# Patient Record
Sex: MALE | Race: WHITE
[De-identification: names, ages, dates, MRNs, and addresses within clinical notes are randomized per-mention and may not be internally consistent; named-entity substitution may affect disease eponyms.]

---

## 2018-03-10 ENCOUNTER — HOSPITAL ENCOUNTER (EMERGENCY)
Dept: HOSPITAL 62 - ER | Age: 38
LOS: 1 days | Discharge: HOME | End: 2018-03-11
Payer: OTHER GOVERNMENT

## 2018-03-10 DIAGNOSIS — R11.10: ICD-10-CM

## 2018-03-10 DIAGNOSIS — F43.10: ICD-10-CM

## 2018-03-10 DIAGNOSIS — F32.9: ICD-10-CM

## 2018-03-10 DIAGNOSIS — T43.592A: ICD-10-CM

## 2018-03-10 DIAGNOSIS — F17.200: ICD-10-CM

## 2018-03-10 DIAGNOSIS — T44.7X2A: Primary | ICD-10-CM

## 2018-03-10 DIAGNOSIS — F10.231: ICD-10-CM

## 2018-03-10 LAB
ADD MANUAL DIFF: NO
ANION GAP SERPL CALC-SCNC: 18 MMOL/L (ref 5–19)
APAP SERPL-MCNC: < 10 UG/ML (ref 10–30)
BARBITURATES UR QL SCN: NEGATIVE
BASOPHILS # BLD AUTO: 0.1 10^3/UL (ref 0–0.2)
BASOPHILS NFR BLD AUTO: 0.9 % (ref 0–2)
BUN SERPL-MCNC: 18 MG/DL (ref 7–20)
CALCIUM: 8.7 MG/DL (ref 8.4–10.2)
CHLORIDE SERPL-SCNC: 96 MMOL/L (ref 98–107)
CO2 SERPL-SCNC: 24 MMOL/L (ref 22–30)
EOSINOPHIL # BLD AUTO: 0.2 10^3/UL (ref 0–0.6)
EOSINOPHIL NFR BLD AUTO: 3.3 % (ref 0–6)
ERYTHROCYTE [DISTWIDTH] IN BLOOD BY AUTOMATED COUNT: 14.5 % (ref 11.5–14)
GLUCOSE SERPL-MCNC: 161 MG/DL (ref 75–110)
HCT VFR BLD CALC: 44.3 % (ref 37.9–51)
HGB BLD-MCNC: 14.8 G/DL (ref 13.5–17)
LYMPHOCYTES # BLD AUTO: 1.6 10^3/UL (ref 0.5–4.7)
LYMPHOCYTES NFR BLD AUTO: 25.8 % (ref 13–45)
MCH RBC QN AUTO: 29.2 PG (ref 27–33.4)
MCHC RBC AUTO-ENTMCNC: 33.5 G/DL (ref 32–36)
MCV RBC AUTO: 87 FL (ref 80–97)
METHADONE UR QL SCN: NEGATIVE
MONOCYTES # BLD AUTO: 0.5 10^3/UL (ref 0.1–1.4)
MONOCYTES NFR BLD AUTO: 7.8 % (ref 3–13)
NEUTROPHILS # BLD AUTO: 3.9 10^3/UL (ref 1.7–8.2)
NEUTS SEG NFR BLD AUTO: 62.2 % (ref 42–78)
PCP UR QL SCN: NEGATIVE
PLATELET # BLD: 320 10^3/UL (ref 150–450)
POTASSIUM SERPL-SCNC: 3.9 MMOL/L (ref 3.6–5)
RBC # BLD AUTO: 5.09 10^6/UL (ref 4.35–5.55)
SALICYLATES SERPL-MCNC: < 1 MG/DL (ref 2–20)
SODIUM SERPL-SCNC: 138.1 MMOL/L (ref 137–145)
TOTAL CELLS COUNTED % (AUTO): 100 %
URINE AMPHETAMINES SCREEN: NEGATIVE
URINE BENZODIAZEPINES SCREEN: NEGATIVE
URINE COCAINE SCREEN: NEGATIVE
URINE MARIJUANA (THC) SCREEN: NEGATIVE
WBC # BLD AUTO: 6.2 10^3/UL (ref 4–10.5)

## 2018-03-10 PROCEDURE — 80048 BASIC METABOLIC PNL TOTAL CA: CPT

## 2018-03-10 PROCEDURE — 99291 CRITICAL CARE FIRST HOUR: CPT

## 2018-03-10 PROCEDURE — 85025 COMPLETE CBC W/AUTO DIFF WBC: CPT

## 2018-03-10 PROCEDURE — 80307 DRUG TEST PRSMV CHEM ANLYZR: CPT

## 2018-03-10 PROCEDURE — 96374 THER/PROPH/DIAG INJ IV PUSH: CPT

## 2018-03-10 PROCEDURE — 93005 ELECTROCARDIOGRAM TRACING: CPT

## 2018-03-10 PROCEDURE — 93010 ELECTROCARDIOGRAM REPORT: CPT

## 2018-03-10 PROCEDURE — 36415 COLL VENOUS BLD VENIPUNCTURE: CPT

## 2018-03-10 PROCEDURE — 96376 TX/PRO/DX INJ SAME DRUG ADON: CPT

## 2018-03-10 NOTE — ER DOCUMENT REPORT
ED General





- General


Stated Complaint: SUICIDAL IDEATION


Time Seen by Provider: 03/10/18 15:34


Notes: 





37-year-old male presents with multidrug overdose.  He took 6 propranolol and 6 

Seroquel this morning at the same time as he stopped drinking after a seven-day 

magdaleno.  History of alcoholism.  Positive suicidal ideation.  He has been 

vomiting is no abdominal pain.  No dizziness or fainting.  Brought in by EMS 

with normal vital signs blood sugar.  Actively suicidal.


TRAVEL OUTSIDE OF THE [place] IN LAST 30 DAYS: No





- Related Data


Allergies/Adverse Reactions: 


 





No Known Allergies Allergy (Verified 03/10/18 17:40)


 











Past Medical History





- Social History


Smoking Status: Current Every Day Smoker


Smoking Education Provided: Yes - The patient ED visit today was directly 

related to their abuse of tobacco. 


Family History: Reviewed & Not Pertinent


Psychiatric Medical History: Reports: Hx Depression





Review of Systems





- Review of Systems


Notes: 





REVIEW OF SYSTEMS





GEN: Denies fever, chills, weight loss


ENT: Denies sore throat, nasal discharge, ear pain


EYES: Denies blurry vision, eye pain, discharge


CV: Denies chest pain, palpitations, edema


RESP: Denies cough, shortness of breath, wheezing


GI: Denies abdominal pain, positive nausea


MSK: Denies joint pain/swelling, edema, 


SKIN: Denies rash, skin lesions


LYMPH: Denies swollen glands/lymph nodes


NEURO: Denies headache, focal weakness or numbness, dizziness


PSYCH: Alcoholism and depression with suicidal ideation








PHYSICAL EXAMINATION





General: Acutely ill, slightly pale


Head: Atraumatic, normocephalic


ENT: Mouth normal, oropharynx moist, no exudates or tonsillar enlargement


Eyes: Conjunctiva normal, pupils equal, lids normal


Neck: No JVD, supple, no guarding


CVS: Normal rate, regular rhythm, no murmurs


Resp: No resp distress, equal and normal breath sounds bilaterally


GI: Nondistended, soft, no tenderness to palpation, no rebound or guarding


Ext: No deformities, no edema, normal range of motion in upper and lower ext


Back: No CVA or midline TTP


Skin: No rash, warm


Lymphatic: No lymphadeopathy noted


Neuro: Awake, alert.  Face symmetric.  GCS 15.  Tremulous, mild with tongue and 

hand fasciculations.


Psychiatric: Suicidal ideation present.





Physical Exam





- Vital signs


Vitals: 


 











Resp BP Pulse Ox


 


 16   100/69   92 


 


 03/10/18 15:48  03/10/18 15:48  03/10/18 15:48














Course





- Re-evaluation


Re-evalutation: 





03/10/18 15:55


Acute ingestion of small doses of propranolol and Seroquel.  Blood pressure is 

soft but not hypotensive, patient is not bradycardic or hypoglycemic so I doubt 

a significant for parental ingestion.  Does not meet criteria for truncal.  In 

terms of Seroquel he looks sluggish but is not comatose.  Will check EKG for 

intervals, check tox labs, monitor closely.  Will place on IVC papers.


03/10/18 16:30


Patient reassessed.  Slightly somnolent after Ativan, but no longer exhibiting 

signs of withdrawal.  Getting IV fluids from EMS.


Labs so far are normal and EKG shows normal intervals.  We will continue to 

observe.  Placed on petition for IVC and constant with the psych team and 

placed a call for psychiatry consult.


03/10/18 18:08


SS.  No signs of withdrawal sleeping calmly.  Vital signs are normal.  Lab 

evaluation is negative.  At this point I am going to medically clear the 

patient for psych consult in the morning.





- Vital Signs


Vital signs: 


 











Temp Pulse Resp BP Pulse Ox


 


 97.8 F      21 H  100/58 L  95 


 


 03/10/18 16:34     03/10/18 18:00  03/10/18 18:00  03/10/18 18:00














- Laboratory


Result Diagrams: 


 03/10/18 15:00





 03/10/18 15:00


Laboratory results interpreted by me: 


 











  03/10/18 03/10/18





  15:00 15:00


 


RDW  14.5 H 


 


Chloride   96 L


 


Glucose   161 H


 


Salicylates   < 1.0 L


 


Acetaminophen   < 10 L














- EKG Interpretation by Me


EKG shows normal: Sinus rhythm


Rate: Normal


Rhythm: NSR - Normal intervals





Critical Care Note





- Critical Care Note


Total time excluding time spent on procedures (mins): 32


Comments: 





The above patient is critically ill.  Not including procedures, but including 

direct re-evaluations, speaking with patient and/or consultants, interpreting 

results, and documenting, I spent the total amount of minute listed listed 

above on critical care time





Discharge





- Discharge


Clinical Impression: 


 Alcohol withdrawal delirium, acute, hyperactive





Intentional propranolol overdose


Qualifiers:


 Encounter type: sequela Qualified Code(s): T44.7X2S - Poisoning by beta-

adrenoreceptor antagonists, intentional self-harm, sequela





Condition: Good


Disposition: PSYCH HOSP/UNIT

## 2018-03-11 VITALS — SYSTOLIC BLOOD PRESSURE: 128 MMHG | DIASTOLIC BLOOD PRESSURE: 74 MMHG

## 2018-03-11 NOTE — ER DOCUMENT REPORT
Doctor's Note


Notes: 





03/11/18 18:13


Rounds earlier today: Chart reviewed and patient interviewed.  Patient took an 

overdose of no significant consequence of about 6 propanolol and 6 Seroquel 

pills.  He says he was thinking about suicide.  Also has been drinking alcohol.

  Labs were all normal.  Vital signs are all normal, except for one heart rate 

of 53.  Patient appears to be medically stable for transfer or discharge.


CLAYTON Jade MD

## 2018-03-11 NOTE — PSYCHOLOGICAL NOTE
Psych Note





- Psych Note


Psych Note: 


Reason for consult: Suicidal ideation


Time of consult 9:15 am Time of Disposition 10:30 am 


Contact Permissions: None 





Patient is a 38-year-old male.  [facility] 

[place] because they have classes and groups of people who experience what 

he experiences in [place].  Patient reports he wants to be around other veterans.  

Patient reports if he is not able to get a bed at the [facility] in [place] that 

he wants his mother to come pick him up so she could drive him there.  Patient 

reports he lives with his parents.  Patient reports that it is around the time 

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  Patient reports that 

he wants other people to talk to about his experience.  Patient reports that he 

was in combat while in [place].  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  

Patient reports he was diagnosed with PTSD.  Patient reports that he took more 

than his prescribed dosage then called the hotline so that he could go to the 

[facility] in [place].  Patient reports he took 6 of his pills.  Patient reports 

that he does not have a plan to kill himself but that he wants to go to an 

inpatient placement.  Patient reports that he wanted clinician to know that if 

she could not get him into [place] that he would drive himself with his 

parents.  Patient reports he does not have a social support system, does not 

attend any groups in [place]. Patient reports that he does not have any 

combat veterans in the area.  She reports he rarely leaves his home and does 

not feel motivated to do anything.  She reports that he sees an outpatient 

therapist [facility] at the [facility] in [place].








Diagnosis: 


311 (F32.9) Unspecified Depressive Disorder, per history


R/O 309.9 (F43.9) Unspecified Trauma and Stressor Related Disorder (patient 

reported PTSD history)








Impression/plan: Patient is psychiatrically cleared for discharge.  

Recommendation for patient to follow-up with outpatient provider at the 

[facility].  Clinician observed patient presented to the ED with 

intention of getting transferred to [facility].  Consulted with Dr. Amador regarding the management and care of patient.

## 2018-07-07 ENCOUNTER — HOSPITAL ENCOUNTER (EMERGENCY)
Dept: HOSPITAL 62 - ER | Age: 38
LOS: 3 days | Discharge: HOME | End: 2018-07-10
Payer: OTHER GOVERNMENT

## 2018-07-07 DIAGNOSIS — F10.920: Primary | ICD-10-CM

## 2018-07-07 DIAGNOSIS — F43.10: ICD-10-CM

## 2018-07-07 DIAGNOSIS — R45.851: ICD-10-CM

## 2018-07-07 LAB
ADD MANUAL DIFF: NO
ALBUMIN SERPL-MCNC: 4.7 G/DL (ref 3.5–5)
ALP SERPL-CCNC: 81 U/L (ref 38–126)
ALT SERPL-CCNC: 73 U/L (ref 21–72)
ANION GAP SERPL CALC-SCNC: 18 MMOL/L (ref 5–19)
APAP SERPL-MCNC: < 10 UG/ML (ref 10–30)
APPEARANCE UR: (no result)
APTT PPP: YELLOW S
AST SERPL-CCNC: 73 U/L (ref 17–59)
BARBITURATES UR QL SCN: NEGATIVE
BASOPHILS # BLD AUTO: 0.1 10^3/UL (ref 0–0.2)
BASOPHILS NFR BLD AUTO: 2.2 % (ref 0–2)
BILIRUB DIRECT SERPL-MCNC: 0.3 MG/DL (ref 0–0.4)
BILIRUB SERPL-MCNC: 0.3 MG/DL (ref 0.2–1.3)
BILIRUB UR QL STRIP: NEGATIVE
BUN SERPL-MCNC: 10 MG/DL (ref 7–20)
CALCIUM: 8.8 MG/DL (ref 8.4–10.2)
CHLORIDE SERPL-SCNC: 103 MMOL/L (ref 98–107)
CO2 SERPL-SCNC: 28 MMOL/L (ref 22–30)
EOSINOPHIL # BLD AUTO: 0.3 10^3/UL (ref 0–0.6)
EOSINOPHIL NFR BLD AUTO: 5.7 % (ref 0–6)
ERYTHROCYTE [DISTWIDTH] IN BLOOD BY AUTOMATED COUNT: 14.8 % (ref 11.5–14)
ETHANOL SERPL-MCNC: 443 MG/DL
GLUCOSE SERPL-MCNC: 113 MG/DL (ref 75–110)
GLUCOSE UR STRIP-MCNC: NEGATIVE MG/DL
HCT VFR BLD CALC: 44.5 % (ref 37.9–51)
HGB BLD-MCNC: 15.2 G/DL (ref 13.5–17)
KETONES UR STRIP-MCNC: NEGATIVE MG/DL
LYMPHOCYTES # BLD AUTO: 1.8 10^3/UL (ref 0.5–4.7)
LYMPHOCYTES NFR BLD AUTO: 36.8 % (ref 13–45)
MCH RBC QN AUTO: 31.2 PG (ref 27–33.4)
MCHC RBC AUTO-ENTMCNC: 34.2 G/DL (ref 32–36)
MCV RBC AUTO: 91 FL (ref 80–97)
METHADONE UR QL SCN: NEGATIVE
MONOCYTES # BLD AUTO: 0.4 10^3/UL (ref 0.1–1.4)
MONOCYTES NFR BLD AUTO: 9.1 % (ref 3–13)
NEUTROPHILS # BLD AUTO: 2.2 10^3/UL (ref 1.7–8.2)
NEUTS SEG NFR BLD AUTO: 46.2 % (ref 42–78)
NITRITE UR QL STRIP: NEGATIVE
PCP UR QL SCN: NEGATIVE
PH UR STRIP: 8 [PH] (ref 5–9)
PLATELET # BLD: 526 10^3/UL (ref 150–450)
POTASSIUM SERPL-SCNC: 4.3 MMOL/L (ref 3.6–5)
PROT SERPL-MCNC: 7.8 G/DL (ref 6.3–8.2)
PROT UR STRIP-MCNC: 30 MG/DL
RBC # BLD AUTO: 4.89 10^6/UL (ref 4.35–5.55)
SALICYLATES SERPL-MCNC: < 1 MG/DL (ref 2–20)
SODIUM SERPL-SCNC: 148.8 MMOL/L (ref 137–145)
SP GR UR STRIP: 1.01
TOTAL CELLS COUNTED % (AUTO): 100 %
URINE AMPHETAMINES SCREEN: NEGATIVE
URINE BENZODIAZEPINES SCREEN: NEGATIVE
URINE COCAINE SCREEN: NEGATIVE
URINE MARIJUANA (THC) SCREEN: NEGATIVE
UROBILINOGEN UR-MCNC: NEGATIVE MG/DL (ref ?–2)
WBC # BLD AUTO: 4.8 10^3/UL (ref 4–10.5)

## 2018-07-07 PROCEDURE — 80053 COMPREHEN METABOLIC PANEL: CPT

## 2018-07-07 PROCEDURE — 96360 HYDRATION IV INFUSION INIT: CPT

## 2018-07-07 PROCEDURE — 36415 COLL VENOUS BLD VENIPUNCTURE: CPT

## 2018-07-07 PROCEDURE — 85025 COMPLETE CBC W/AUTO DIFF WBC: CPT

## 2018-07-07 PROCEDURE — 80307 DRUG TEST PRSMV CHEM ANLYZR: CPT

## 2018-07-07 PROCEDURE — 93010 ELECTROCARDIOGRAM REPORT: CPT

## 2018-07-07 PROCEDURE — 96372 THER/PROPH/DIAG INJ SC/IM: CPT

## 2018-07-07 PROCEDURE — 99285 EMERGENCY DEPT VISIT HI MDM: CPT

## 2018-07-07 PROCEDURE — 93005 ELECTROCARDIOGRAM TRACING: CPT

## 2018-07-07 PROCEDURE — 81001 URINALYSIS AUTO W/SCOPE: CPT

## 2018-07-07 PROCEDURE — S0119 ONDANSETRON 4 MG: HCPCS

## 2018-07-07 NOTE — ER DOCUMENT REPORT
ED Medical Screen (RME)





- General


Chief Complaint: Suicidal Ideation


Stated Complaint: PYSCH EVAULATION


Time Seen by Provider: 07/07/18 10:11


Notes: 


The patient is a 38-year-old male, past medical history PTSD, chronic alcoholic

, presents with increasing drinking and combativeness.  He last drank this 

morning and thinks he might be withdrawing. Not taking any of [facility] med[facility] 

from the [facility].





PE: Mildly agitated. Tachycardia. Appears intoxicated.





I have greeted and performed a rapid initial assessment of this patient.  A 

comprehensive ED assessment and evaluation of the patient, analysis of test 

results and completion of the medical decision making process will be conducted 

by additional ED providers.


TRAVEL OUTSIDE OF THE [place] IN LAST 30 DAYS: No





- Related Data


Allergies/Adverse Reactions: 


 





No Known Allergies Allergy (Verified 07/07/18 09:54)


 











Past Medical History





- Social History


Family history: Thyroid Disfunction


Renal/ Medical History: Denies: Hx Peritoneal Dialysis


Psychiatric Medical History: Reports: Hx Depression





Physical Exam





- Vital signs


Vitals: 





 











Temp Pulse Resp BP


 


 98.0 F   128 H  22 H  124/86 H


 


 07/07/18 09:59  07/07/18 09:59  07/07/18 09:59  07/07/18 09:59














Course





- Vital Signs


Vital signs: 





 











Temp Pulse Resp BP Pulse Ox


 


 98.0 F   128 H  22 H  124/86 H   


 


 07/07/18 09:59  07/07/18 09:59  07/07/18 09:59  07/07/18 09:59

## 2018-07-07 NOTE — ER DOCUMENT REPORT
Addendum entered and electronically signed by CAITLIN PATEL LCSWA  07/10/18 11:

58: 








Discharge





- Discharge


Clinical Impression: 


 Alcohol abuse, Post traumatic stress disorder (PTSD), Suicidal ideation





Alcohol intoxication


Qualifiers:


 Complication of substance-induced condition: uncomplicated Qualified Code(s): 

F10.920 - Alcohol use, unspecified with intoxication, uncomplicated





Condition: Stable


Disposition: HOME, SELF-CARE


Additional Instructions: 


ACUTE ALCOHOL INTOXICATION and ALCOHOL ABUSE:





     Your evaluation revealed very high levels of alcohol.  You can die from 

drinking a large amount of alcohol rapidly!  Further, there's the risk of falls

, traffic accidents, and fights.  A high portion (about 50 percent) of the 

serious injuries seen in hospital emergency rooms are caused by alcohol.


     Alcohol overdosage is usually due to an underlying emotional or 

psychiatric problem.  You may benefit from counselling.


     If "binge" drinking is an ongoing problem for you, or if you drink ANY 

AMOUNT of alcohol EVERY day, you most likely have a tendency to alcoholism.  

You should avoid alcohol totally.  We can refer you for treatment.  Persons 

with alcohol problems are often also prone to other addictions -- you should 

discuss any use of medications or drugs with the doctor.


     You should be watched at home for the next several hours by someone who 

has not been drinking. Get extra fluids for the next 24 hours.  Call the doctor 

if there is repeated vomiting, increasing headache, decreasing level of 

alertness, or any other worsening.








CHRONIC ALCOHOLISM and ALCOHOL ABUSE:





     Your evaluation reveals evidence of chronic alcoholism, an addiction to 

alcohol.  The tendency to alcoholism may be inherited.


     Chronic use of alcohol weakens muscles, causes fatty deposits in the liver

, damages the stomach, makes you more prone to infections, and can cause birth 

defects in unborn children.  In the long run, brain atrophy and cirrhosis of 

the liver result.  You are also at greater risk for certain types of cancer, 

such as cancer of the mouth, throat, stomach, and liver.


     Counselling services are available to help you.  In-hospital treatment 

programs often help.  Support groups such as Alcoholics Anonymous can be very 

useful in beating this addiction.  Your physician can make a referral for you.


     As alcoholics often are prone to other addictions, you should discuss your 

use of any other medications with the doctor.








ALCOHOL WITHDRAWAL:





     Your symptoms are caused by alcohol withdrawal. After a period of frequent 

drinking, the brain and body are changed by the alcohol. When you quit or 

reduce your drinking, the nervous system becomes unstable. Withdrawal symptoms 

can start a few hours after your last drink, but sometimes don't begin until a 

couple of days later. Symptoms can include shakiness, sweating, insomnia, nausea

, vomiting, fearfulness, hallucinations, and seizures.


     In addition to the acute effects of alcohol withdrawal, we often have to 

deal with the medical effects of alcoholism. These problems often include 

dehydration, stomach irritation, intestinal bleeding, low blood sugar, liver 

disease, and pancreas inflammation.


     Treatment for alcohol withdrawal includes mild sedatives, vitamins, and 

fluids. You need to be with someone who can help if symptoms become severe. 

Many patients can withdraw at home. Admission to the hospital or a detox 

facility may be necessary if withdrawal symptoms are severe and uncontrollable.


     Abstaining from alcohol is the only effective long-term treatment. If you 

start drinking again, you will not be able to control yourself after the first 

drink. Treatment programs are available. In addition, many alcoholics benefit 

from Alcoholics Anonymous or other support groups available through your 

counselor or [employer] . AL-FADUMO and JASPAL-TENA are support groups for 

friends and family members of an alcoholic.


     Go to the emergency room if you develop persistent vomiting, severe 

abdominal pain, fever, shortness of breath, hallucinations, uncontrollable 

tremors, or seizures.





Post-Traumatic Stress Disorder





     You seem to have post-traumatic stress disorder (PTSD). PTSD can cause 

chronic anxiety, sleeping problems, social withdrawal, and drug abuse. It can 

occur following a traumatic personal experience such as an accident, rape, 

assault, or death of a loved one, or after experiencing a war or natural 

disaster.  


     Symptoms may be delayed for days or even years. Emotional numbing, the 

inability to express grief, is usually the earliest sign. There may be apathy 

or agitation, aggression, and inability to perform ordinary tasks. Often there 

are frightening nightmares and sudden, intruding memories of the trauma. Panic 

attacks and feelings of guilt are common. Alcohol and drug use make post-

traumatic stress symptoms worse.


     Medication may be temporarily necessary to combat anxiety, panic attacks, 

and depression. Medicine should not be considered a "cure." You must deal with 

the trauma and prepare to go on. Group therapy is often helpful. This helps you 

"talk through" the problem with others who share your symptoms. We can provide 

you with an appropriate referral.





[facility] and 

[facility] treatment on 7/13/2018 at 11 AM.  You have also been provided 

additional resources which include residential treatment programs for alcohol 

abuse.  AT ANY TIME, IF YOUR SYMPTOMS CHANGE SIGNIFICANTLY OR WORSEN OR YOU 

DEVELOP NEW SYMPTOMS, RETURN TO THE EMERGENCY DEPARTMENT IMMEDIATELY FOR RE-

EVALUATION.














Referrals: 


[facility] [Provider Group] - 07/13/18 11:00 am (You also have 

an appointment for medication mangement tomorrow 7/11/2018 at 10:30)





Original Note:








ED Psych Disorder / Suicide





- General


Mode of Arrival: Ambulatory


Information source: Patient, Relative - FATHER


TRAVEL OUTSIDE OF THE [place] IN LAST 30 DAYS: No





- HPI


Patient complains to provider of: Agitated, Suicidal ideation


Onset: Yesterday


Onset was: Cannot confirm


Quality of pain: No pain


Severity: Moderate


Suicide Risk Factors: Male, Other mental health dx.


Situational problems related to: Other - PTSD


Normal mood: No


Associated symptoms: Agitated, Depressed, Irritable, Tearful, Unable to sleep


Similar symptoms previously: No


Recently seen / treated by doctor: No





<CRISPIN GUILLEN - Last Filed: 07/07/18 19:40>





<CAITLIN PATEL - Last Filed: 07/10/18 11:57>





<ESTER SHIPLEY - Last Filed: 07/10/18 16:22>





- General


Chief Complaint: Suicidal Ideation


Stated Complaint: PYSCH EVAULATION


Time Seen by Provider: 07/07/18 10:11





- HPI


Notes: 





This patient apparently has a long-standing problem with PTSD and alcoholism.  

[facility]  Parent says patient ran out of all of his 

medicines about a month ago and did okay for a while but now is back to his 

depressed, alcoholic, suicidal baseline. (CRISPIN GUILLEN)





- Related Data


Allergies/Adverse Reactions: 


 





No Known Allergies Allergy (Verified 07/07/18 09:54)


 











Past Medical History





- General


Information source: Patient, Relative





- Social History


Smoking Status: Unknown if Ever Smoked


Cigarette use (# per day): No


Chew tobacco use (# tins/day): No


Frequency of alcohol use: Heavy


Drug Abuse: None


Family History: Reviewed & Not Pertinent


Patient has suicidal ideation: No


Patient has homicidal ideation: No





- Past Medical History


Cardiac Medical History: Reports: None


Pulmonary Medical History: Reports: None


EENT Medical History: Reports: None


Neurological Medical History: Reports: None


Endocrine Medical History: Reports: None


Renal/ Medical History: Reports: None.  Denies: Hx Peritoneal Dialysis


Malignancy Medical History: Reports None


GI Medical History: Reports: None


Musculoskeltal Medical History: Reports None


Psychiatric Medical History: Reports: Hx Depression, Hx Post Traumatic Stress 

Disorder, Other - ALCOHOLISM





<CRISPIN GUILLEN - Last Filed: 07/07/18 19:40>





Review of Systems





- Review of Systems


Constitutional: No symptoms reported


EENT: No symptoms reported


Cardiovascular: No symptoms reported


Respiratory: No symptoms reported


Gastrointestinal: Nausea


Genitourinary: No symptoms reported


Musculoskeletal: No symptoms reported


Skin: No symptoms reported


Neurological/Psychological: See HPI





<CRISPIN GUILLEN - Last Filed: 07/07/18 19:40>





Physical Exam





- Vital signs


Interpretation: Tachycardic, Tachypneic.  No: Hypertensive





- General


General appearance: Anxious


In distress: None





- HEENT


Head: Normocephalic


Eyes: Normal


Conjunctiva: Normal


Ears: Normal


Nasal: Normal


Mouth/Lips: Normal


Mucous membranes: Dry





- Respiratory


Respiratory status: No respiratory distress


Breath sounds: Normal





- Cardiovascular


Rhythm: Regular


Heart sounds: Normal auscultation


Murmur: No





- Abdominal


Inspection: Normal


Distension: No distension


Bowel sounds: Hypoactive





- Extremities


General upper extremity: Normal inspection


General lower extremity: Normal inspection





- Neurological


Neuro grossly intact: Yes


Cognition: Normal


Orientation: AAOx4





- Psychological


Associated symptoms: Agitated, Depressed, Tearful, Other - ADMITS TO S.I.





- Skin


Skin Temperature: Warm


Skin Moisture: Dry


Skin Color: Normal


Skin Turgor: Elastic





<CRISPIN GUILLEN - Last Filed: 07/07/18 19:40>





<CAITLIN PATEL - Last Filed: 07/10/18 11:57>





<ESTER SHIPLEY - Last Filed: 07/10/18 16:22>





- Vital signs


Vitals: 





 











Temp Pulse Resp BP


 


 98.0 F   128 H  22 H  124/86 H


 


 07/07/18 09:59  07/07/18 09:59  07/07/18 09:59  07/07/18 09:59














- HEENT


Notes: 





STRONG ETHANOL ODOR TO BREATH (CRISPIN GUILLEN)





Course





- Laboratory


Result Diagrams: 


 07/07/18 10:25





 07/07/18 10:25





- EKG Interpretation by Me


EKG shows normal: Sinus rhythm, Axis, Intervals, QRS Complexes.  abnormal: ST-T 

Waves - BORDERLINE T ABNLS, NS


Rate: Tachycardia





<WILMERCRISPIN - Last Filed: 07/07/18 19:40>





- Laboratory


Result Diagrams: 


 07/07/18 10:25





 07/07/18 10:25





<CAITLIN PATEL - Last Filed: 07/10/18 11:57>





- Laboratory


Result Diagrams: 


 07/07/18 10:25





 07/07/18 10:25





<ESTER SHIPLEY - Last Filed: 07/10/18 16:22>





- Vital Signs


Vital signs: 





 











Temp Pulse Resp BP Pulse Ox


 


 97.8 F   73   18   117/86 H  99 


 


 07/09/18 22:00  07/09/18 22:00  07/09/18 22:00  07/09/18 22:00  07/09/18 22:00














- Laboratory


Laboratory results interpreted by me: 





 











  07/07/18 07/07/18 07/07/18





  10:25 10:25 11:45


 


RDW  14.8 H  


 


Plt Count  526 H  


 


Basophils %  2.2 H  


 


Sodium   148.8 H 


 


Glucose   113 H 


 


AST   73 H 


 


ALT   73 H 


 


Urine Protein    30 H


 


Salicylates   < 1.0 L 


 


Acetaminophen   < 10 L 


 


Serum Alcohol   443 H* 














Discharge





<TOMAAKUACLARISACRISPIN - Last Filed: 07/07/18 19:40>





<CAITLIN PATEL - Last Filed: 07/10/18 11:57>





<ESTER SHIPLEY - Last Filed: 07/10/18 16:22>





- Discharge


Clinical Impression: 


 Alcohol abuse, Post traumatic stress disorder (PTSD), Suicidal ideation





Alcohol intoxication


Qualifiers:


 Complication of substance-induced condition: uncomplicated Qualified Code(s): 

F10.920 - Alcohol use, unspecified with intoxication, uncomplicated





Condition: Stable


Disposition: HOME, SELF-CARE


Additional Instructions: 


ACUTE ALCOHOL INTOXICATION and ALCOHOL ABUSE:





     Your evaluation revealed very high levels of alcohol.  You can die from 

drinking a large amount of alcohol rapidly!  Further, there's the risk of falls

, traffic accidents, and fights.  A high portion (about 50 percent) of the 

serious injuries seen in hospital emergency rooms are caused by alcohol.


     Alcohol overdosage is usually due to an underlying emotional or 

psychiatric problem.  You may benefit from counselling.


     If "binge" drinking is an ongoing problem for you, or if you drink ANY 

AMOUNT of alcohol EVERY day, you most likely have a tendency to alcoholism.  

You should avoid alcohol totally.  We can refer you for treatment.  Persons 

with alcohol problems are often also prone to other addictions -- you should 

discuss any use of medications or drugs with the doctor.


     You should be watched at home for the next several hours by someone who 

has not been drinking. Get extra fluids for the next 24 hours.  Call the doctor 

if there is repeated vomiting, increasing headache, decreasing level of 

alertness, or any other worsening.








CHRONIC ALCOHOLISM and ALCOHOL ABUSE:





     Your evaluation reveals evidence of chronic alcoholism, an addiction to 

alcohol.  The tendency to alcoholism may be inherited.


     Chronic use of alcohol weakens muscles, causes fatty deposits in the liver

, damages the stomach, makes you more prone to infections, and can cause birth 

defects in unborn children.  In the long run, brain atrophy and cirrhosis of 

the liver result.  You are also at greater risk for certain types of cancer, 

such as cancer of the mouth, throat, stomach, and liver.


     Counselling services are available to help you.  In-hospital treatment 

programs often help.  Support groups such as Alcoholics Anonymous can be very 

useful in beating this addiction.  Your physician can make a referral for you.


     As alcoholics often are prone to other addictions, you should discuss your 

use of any other medications with the doctor.








ALCOHOL WITHDRAWAL:





     Your symptoms are caused by alcohol withdrawal. After a period of frequent 

drinking, the brain and body are changed by the alcohol. When you quit or 

reduce your drinking, the nervous system becomes unstable. Withdrawal symptoms 

can start a few hours after your last drink, but sometimes don't begin until a 

couple of days later. Symptoms can include shakiness, sweating, insomnia, nausea

, vomiting, fearfulness, hallucinations, and seizures.


     In addition to the acute effects of alcohol withdrawal, we often have to 

deal with the medical effects of alcoholism. These problems often include 

dehydration, stomach irritation, intestinal bleeding, low blood sugar, liver 

disease, and pancreas inflammation.


     Treatment for alcohol withdrawal includes mild sedatives, vitamins, and 

fluids. You need to be with someone who can help if symptoms become severe. 

Many patients can withdraw at home. Admission to the hospital or a detox 

facility may be necessary if withdrawal symptoms are severe and uncontrollable.


     Abstaining from alcohol is the only effective long-term treatment. If you 

start drinking again, you will not be able to control yourself after the first 

drink. Treatment programs are available. In addition, many alcoholics benefit 

from Alcoholics Anonymous or other support groups available through your 

counselor or [employer] . AL-ANOCHARLES and JASPAL-TEEN are support groups for 

friends and family members of an alcoholic.


     Go to the emergency room if you develop persistent vomiting, severe 

abdominal pain, fever, shortness of breath, hallucinations, uncontrollable 

tremors, or seizures.





Post-Traumatic Stress Disorder





     You seem to have post-traumatic stress disorder (PTSD). PTSD can cause 

chronic anxiety, sleeping problems, social withdrawal, and drug abuse. It can 

occur following a traumatic personal experience such as an accident, rape, 

assault, or death of a loved one, or after experiencing a war or natural 

disaster.  


     Symptoms may be delayed for days or even years. Emotional numbing, the 

inability to express grief, is usually the earliest sign. There may be apathy 

or agitation, aggression, and inability to perform ordinary tasks. Often there 

are frightening nightmares and sudden, intruding memories of the trauma. Panic 

attacks and feelings of guilt are common. Alcohol and drug use make post-

traumatic stress symptoms worse.


     Medication may be temporarily necessary to combat anxiety, panic attacks, 

and depression. Medicine should not be considered a "cure." You must deal with 

the trauma and prepare to go on. Group therapy is often helpful. This helps you 

"talk through" the problem with others who share your symptoms. We can provide 

you with an appropriate referral.





[facility] treatment on 7/13/2018 at 11 AM.  You have also been provided 

additional resources which include residential treatment programs for alcohol 

abuse.  AT ANY TIME, IF YOUR SYMPTOMS CHANGE SIGNIFICANTLY OR WORSEN OR YOU 

DEVELOP NEW SYMPTOMS, RETURN TO THE EMERGENCY DEPARTMENT IMMEDIATELY FOR RE-

EVALUATION.














Prescriptions: 


Olanzapine [Zyprexa 5 mg Tablet] 5 mg PO QHS #14 tablet


Buspirone HCl [Buspar 5 mg Tablet] 1 tab PO BID #30 tab


Referrals: 


[facility] [Provider Group] - 07/13/18 11:00 am (You also have 

an appointment for medication mangement tomorrow 7/11/2018 at 10:30)

## 2018-07-07 NOTE — EKG REPORT
SEVERITY:- BORDERLINE ECG -

SINUS TACHYCARDIA

BORDERLINE T WAVE ABNORMALITIES

:

Confirmed by: Amilcar Garibay MD 07-Jul-2018 10:36:18

## 2018-07-07 NOTE — PSYCHOLOGICAL NOTE
Psych Note





- Psych Note


Psych Note: 





The patient is a 38-year-old male, past medical history PTSD, chronic alcoholic

, presents with increasing drinking and combativeness.  He last drank this 

morning and thinks he might be withdrawing. [facility].





Patient is currently heavily intoxicated. Patient disclosed that he has been 

binge drinking for the last 2 weeks.  Patient is withdrawn and unwilling to 

engage with clinician.  Patient refuses to make eye contact and asks for 

privacy. When it was explained the patient is on IVC he started to cry but 

still refused to look at clinician. 





no medication recommendations at this time








291.9 (F10.99) Unspecified Alcohol Related Disorder, per history


311 (F32.9) Unspecified Depressive Disorder, per history


309.9 (F43.9) Unspecified Trauma and Stressor Related Disorder





Impression/Plan: Recommendation to IVC.  Patient is currently intoxicated and 

has impaired insight, judgment and impulse control. Patient is withdrawn and 

unwilling to engage with clinician.  When it was explained the patient is on 

IVC he started to cry but still refused to look at clinician. Consulted with 

Dr. Amador regarding the management and care of patient. ED Doctor in 

agreement with recommendations.

## 2018-07-08 NOTE — ER DOCUMENT REPORT
Doctor's Note


Notes: 





07/08/18 09:36


I saw and evaluated the patient. Vitals stable. Patient has no complaints at 

this time. Patient complained of tremors early this AM. Dr. Chairez saw the 

patient. Ativan provided although he did not believe the patient was in true 

DTs. Patient denies daily ETOH consumption.  Patient denies suicidal or 

homicidal ideations. No delusions or hallucinations. I will discuss plan of 

care with behavioral health.

## 2018-07-09 NOTE — PSYCHOLOGICAL NOTE
Psych Note





- Psych Note


Psych Note: 


Reason for Consult: suicidal ideation and substance abuse





The patient is a 38-year-old male, past medical history PTSD, chronic alcoholic

, presents with increasing drinking and combativeness.  He last drank this 

morning and thinks he might be withdrawing. Not [facility].





Clinician conducted checking with patient


Patient discloses that he is concerned about taking up in bed here in [facility] ED.  

He continues to state that he is found he has had more success going straight 

to [place] [facility]  He disclosed that in 2017 

he had a better year because he was in treatment still, he confirms that he is 

not completely medication compliant currently.  Patient confirms that he 

probably made suicidal comments while under the influence but denies having a 

plan.





Clinician attempted to contact patient's mother; voicemail not set up.





Medication recommendations per [facility]'s contracted psychiatrist Dr. Ania MEAD 

are as follows


1.  Zyprexa 5 mg nightly


2.  BuSpar 5 mg twice daily





Diagnosis


291.9 (F10.99) Unspecified Alcohol Related Disorder, per history


311 (F32.9) Unspecified Depressive Disorder, per history


309.9 (F43.9) Unspecified Trauma and Stressor Related Disorder





Impression/Plan: Recommendation to continue under IVC.  Patient is in need of 

significant treatment for both his mental health and substance abuse.  Patient 

has a history of binge drinking and suicidal attempts.  Patient is recommended 

[facility]  Consulted with Dr. Amador regarding 

the management and care of patient. ED Doctor in agreement with 

recommendations.

## 2018-07-09 NOTE — ER DOCUMENT REPORT
Doctor's Note


Notes: 


07/09/18 09:09 I have evaluated this patient this am and has no c/o at this 

time. Feels all of their needs are being met and physical exam is normal.  

Awaiting dispositon per mental health.





07/09/18 15:27 Mental Health is recommending Zyprexa 5mg qHS and Buspar 5 mg 

bid.

## 2018-07-10 VITALS — SYSTOLIC BLOOD PRESSURE: 115 MMHG | DIASTOLIC BLOOD PRESSURE: 80 MMHG

## 2018-07-10 NOTE — ER DOCUMENT REPORT
Doctor's Note


Notes: 





07/10/18 09:41


Pt is aware that he is not being [facility] but is to be discharged 

home to his parents. He denies any alcohol withdrawals. Pt communicates well 

and is cooperative although he appears to be irritated that he is here.








PHYSICAL EXAM:





GENERAL: Alert. No acute distress.





HEAD: Normocephalic, atraumatic.





EYES: Pupils equal, round, and reactive to light. Extraocular movements intact.





ENT: Oral mucosa moist, tongue midline. 





NECK: Full range of motion. Supple. Trachea midline.





LUNGS: Clear to auscultation bilaterally, no wheezes, rales, or rhonchi. No 

respiratory distress.





HEART: Regular rate and rhythm. No murmurs, gallops, or rubs.





ABDOMEN: Soft, non-tender. Non-distended. Bowel sounds present in all 4 

quadrants. No guarding, rebound, or rigidity.





EXTREMITIES: Moves all 4 extremities spontaneously. 





NEUROLOGICAL: Alert and oriented x3. Normal speech.





PSYCH: Communicates well. Appears irritated that he is here, however 

cooperative.





SKIN: Warm, dry.


 (BHUPENDRA CANNON)








07/10/18 20:00


Patient is stable, father came to pick him up around 5:00 in the evening, 

discharged to home with his parents.  Prescriptions written for 2 weeks of 

medications that we started here. (ESTER SHIPLEY)





Scribe Documentation





- Scribe


Written by Scribe:: Bhupendra Cannon, Gar[place] 7/10/18 1142


acting as scribe for :: Shamir

## 2018-07-10 NOTE — PSYCHOLOGICAL NOTE
Psych Note





- Psych Note


Psych Note: 





Reason for Consult: suicidal ideation and substance abuse





The patient is a 38-year-old male, past medical history PTSD, chronic alcoholic

, presents with increasing drinking and combativeness.  He last drank this 

morning and thinks he might be withdrawing. Not taking [facility] 

[facility].





Clinician conducted checking with patient


Patient disclosed that he is feeling "all right" and denies suicidal and 

homicidal ideation.  Patient asked with the current plan is for today.  He 

reports that his mother works at [employer] if her cell phone is 

not working.  He confirms he has his father's phone number and his cell phone 

and agrees to allow clinician to make contact with him.





Clinician attempted to contact patient's mother again today; voicemail not set 

up.





Clinician spoke with patient's father.  He disclosed the patient's PTSD and 

alcoholism has created the patient needing services like "revolving door...He 

[facility] many [facility] and as soon as the probe to deep he leaves...he 

has many medications then stop taking them...then starts self medicating 

because he is an alcoholic." He continued to report the patient "This last week 

he was hibernating in his room and was continually drunk...I have had to 

disable his car because he loves to drink drunk." 





[facility].  Patient's 

appointment is July 24, 2018 at 2 PM.





Medication recommendations per [facility]'s contracted psychiatrist Dr. Ania MEAD 

are as follows


1.  Zyprexa 5 mg nightly


2.  BuSpar 5 mg twice daily





Diagnosis


291.9 (F10.99) Unspecified Alcohol Related Disorder, per history


311 (F32.9) Unspecified Depressive Disorder, per history


309.9 (F43.9) Unspecified Trauma and Stressor Related Disorder





Impression/Plan: Patient is recommended for rescind of IVC and is considered 

cleared from acute psychiatric services.  Patient is no longer under the 

influence and denies suicidal and homicidal ideation.  Patient discloses 

difficulty in maintaining sobriety and has had a very difficult time with his 

treatment of PTSD.  [facility].  His next appointment is July 24, 2018 at 2 PM. Patient is recommended 

for intensive [facility] the [facility]. Additional information has been 

provided to the patient in regards to long-term residential substance abuse 

treatment.  Consulted with Dr. Amador regarding the management and care of 

patient. ED Doctor in agreement with recommendations.

## 2019-04-19 ENCOUNTER — HOSPITAL ENCOUNTER (EMERGENCY)
Dept: HOSPITAL 62 - ER | Age: 39
Discharge: HOME | End: 2019-04-19
Payer: OTHER GOVERNMENT

## 2019-04-19 VITALS — SYSTOLIC BLOOD PRESSURE: 113 MMHG | DIASTOLIC BLOOD PRESSURE: 81 MMHG

## 2019-04-19 DIAGNOSIS — Z79.899: ICD-10-CM

## 2019-04-19 DIAGNOSIS — F32.9: ICD-10-CM

## 2019-04-19 DIAGNOSIS — F10.10: Primary | ICD-10-CM

## 2019-04-19 DIAGNOSIS — F41.9: ICD-10-CM

## 2019-04-19 DIAGNOSIS — R00.0: ICD-10-CM

## 2019-04-19 DIAGNOSIS — R11.10: ICD-10-CM

## 2019-04-19 DIAGNOSIS — F43.10: ICD-10-CM

## 2019-04-19 LAB
ADD MANUAL DIFF: NO
ALBUMIN SERPL-MCNC: 5.1 G/DL (ref 3.5–5)
ALP SERPL-CCNC: 115 U/L (ref 38–126)
ALT SERPL-CCNC: 31 U/L (ref 21–72)
ANION GAP SERPL CALC-SCNC: 20 MMOL/L (ref 5–19)
APAP SERPL-MCNC: < 10 UG/ML (ref 10–30)
APPEARANCE UR: (no result)
APTT PPP: YELLOW S
AST SERPL-CCNC: 62 U/L (ref 17–59)
BARBITURATES UR QL SCN: NEGATIVE
BASOPHILS # BLD AUTO: 0.1 10^3/UL (ref 0–0.2)
BASOPHILS NFR BLD AUTO: 0.7 % (ref 0–2)
BILIRUB DIRECT SERPL-MCNC: 0.3 MG/DL (ref 0–0.4)
BILIRUB SERPL-MCNC: 0.7 MG/DL (ref 0.2–1.3)
BILIRUB UR QL STRIP: NEGATIVE
BUN SERPL-MCNC: 26 MG/DL (ref 7–20)
CALCIUM: 9.5 MG/DL (ref 8.4–10.2)
CHLORIDE SERPL-SCNC: 100 MMOL/L (ref 98–107)
CO2 SERPL-SCNC: 23 MMOL/L (ref 22–30)
EOSINOPHIL # BLD AUTO: 0 10^3/UL (ref 0–0.6)
EOSINOPHIL NFR BLD AUTO: 0.5 % (ref 0–6)
ERYTHROCYTE [DISTWIDTH] IN BLOOD BY AUTOMATED COUNT: 14.1 % (ref 11.5–14)
ETHANOL SERPL-MCNC: 267 MG/DL
GLUCOSE SERPL-MCNC: 146 MG/DL (ref 75–110)
GLUCOSE UR STRIP-MCNC: NEGATIVE MG/DL
HCT VFR BLD CALC: 49.3 % (ref 37.9–51)
HGB BLD-MCNC: 16.9 G/DL (ref 13.5–17)
KETONES UR STRIP-MCNC: 20 MG/DL
LYMPHOCYTES # BLD AUTO: 2.1 10^3/UL (ref 0.5–4.7)
LYMPHOCYTES NFR BLD AUTO: 19.6 % (ref 13–45)
MCH RBC QN AUTO: 30.1 PG (ref 27–33.4)
MCHC RBC AUTO-ENTMCNC: 34.2 G/DL (ref 32–36)
MCV RBC AUTO: 88 FL (ref 80–97)
METHADONE UR QL SCN: NEGATIVE
MONOCYTES # BLD AUTO: 0.9 10^3/UL (ref 0.1–1.4)
MONOCYTES NFR BLD AUTO: 8.1 % (ref 3–13)
NEUTROPHILS # BLD AUTO: 7.8 10^3/UL (ref 1.7–8.2)
NEUTS SEG NFR BLD AUTO: 71.1 % (ref 42–78)
NITRITE UR QL STRIP: NEGATIVE
PCP UR QL SCN: NEGATIVE
PH UR STRIP: 6 [PH] (ref 5–9)
PLATELET # BLD: 395 10^3/UL (ref 150–450)
POTASSIUM SERPL-SCNC: 4 MMOL/L (ref 3.6–5)
PROT SERPL-MCNC: 9 G/DL (ref 6.3–8.2)
PROT UR STRIP-MCNC: 100 MG/DL
RBC # BLD AUTO: 5.61 10^6/UL (ref 4.35–5.55)
SALICYLATES SERPL-MCNC: < 1 MG/DL (ref 2–20)
SODIUM SERPL-SCNC: 142.7 MMOL/L (ref 137–145)
SP GR UR STRIP: 1.02
TOTAL CELLS COUNTED % (AUTO): 100 %
URINE AMPHETAMINES SCREEN: NEGATIVE
URINE BENZODIAZEPINES SCREEN: NEGATIVE
URINE COCAINE SCREEN: NEGATIVE
URINE MARIJUANA (THC) SCREEN: NEGATIVE
UROBILINOGEN UR-MCNC: NEGATIVE MG/DL (ref ?–2)
WBC # BLD AUTO: 10.9 10^3/UL (ref 4–10.5)

## 2019-04-19 PROCEDURE — 96361 HYDRATE IV INFUSION ADD-ON: CPT

## 2019-04-19 PROCEDURE — 36415 COLL VENOUS BLD VENIPUNCTURE: CPT

## 2019-04-19 PROCEDURE — 96376 TX/PRO/DX INJ SAME DRUG ADON: CPT

## 2019-04-19 PROCEDURE — 93010 ELECTROCARDIOGRAM REPORT: CPT

## 2019-04-19 PROCEDURE — 85025 COMPLETE CBC W/AUTO DIFF WBC: CPT

## 2019-04-19 PROCEDURE — 99285 EMERGENCY DEPT VISIT HI MDM: CPT

## 2019-04-19 PROCEDURE — 96374 THER/PROPH/DIAG INJ IV PUSH: CPT

## 2019-04-19 PROCEDURE — 93005 ELECTROCARDIOGRAM TRACING: CPT

## 2019-04-19 PROCEDURE — 81001 URINALYSIS AUTO W/SCOPE: CPT

## 2019-04-19 PROCEDURE — 80053 COMPREHEN METABOLIC PANEL: CPT

## 2019-04-19 PROCEDURE — 80307 DRUG TEST PRSMV CHEM ANLYZR: CPT

## 2019-04-19 PROCEDURE — 96375 TX/PRO/DX INJ NEW DRUG ADDON: CPT

## 2019-04-19 NOTE — EKG REPORT
SEVERITY:- ABNORMAL ECG -

BORDERLINE RIGHT AXIS DEVIATION

BORDERLINE T ABNORMALITIES, INFERIOR LEADS

SINUS RHYTHM

:

Confirmed by: Linsey Pickering MD 19-Apr-2019 09:50:33

## 2019-04-19 NOTE — PSYCHOLOGICAL NOTE
Psych Note





- Psych Note


Date seen by psych provider: 04/19/19


Time seen by psych provider: 07:55


Psych Note: 


Reason for Consult: substance abuse


Patient denies consent for plan of care to be discussed with anyone





Patient is a pleasant 39-year-old male who presents with complaint of severe 

anxiety.  He says that he used to drink alcohol on a regular basis but has been 

clean for 6 months.





Clinician attempted to contact patient's mother for collateral; however, phone 

number in system is disconnected.


There are no other numbers in the system for the patient and there are no phone 

numbers written in the patient's paper chart.





Patient is sleeping and had to be awoken but was immediately alert and 

orientated person, place, time and circumstance.  Mood and affect is currently 

flat; patient is currently hung over. Delusions are absent and behaviours are 

congruent with an intact reality based presentation (ie organized and linear 

thought processes). Eye contact is poor as patient keeps his eyes closed.  

intellectual abilities appear to be with normal range. Conversational speech are

within normal rate, tone and prosody. attention and concentration is good.  

Insight, judgment and impulse control is fair. 





Diagnosis


291.9 (F10.99) Unspecified Alcohol Related Disorder, per history


311 (F32.9) Unspecified Depressive Disorder, per history


309.9 (F43.9) Unspecified Trauma and Stressor Related Disorder





Impression/Plan: Patient is cleared for acute psychiatric services.  Patient 

presented to [facility]'s with concerns of relapsing after 6 

months of sobriety.  He is able to identify his trigger was returning to [place] 

[place].  He denies any thoughts of wanting to harm himself or others.  He 

confirms he has an upcoming mental health appointment with his [facility].  He confirms he would like to speak with 

them during his appointment treatment options for his alcoholism i.e. detox 

and/or ongoing substance use treatment. When developing plan for transportation 

home, patient declines assistance from clinician to contact family or friends.  

Patient discloses he would like to set up his own transportation home after 

discharge.  He identifies that he will be contacting [facility]. 





Dr. Amador was consulted on the care and management of this patient; attending 

physician is in agreement with recommendations and disposition.

## 2019-04-19 NOTE — ER DOCUMENT REPORT
ED General





- General


TRAVEL OUTSIDE OF THE [place] IN LAST 30 DAYS: No





<ROMEO LEACH - Last Filed: 04/19/19 05:24>





<CAITLIN PATEL - Last Filed: 04/19/19 12:06>





<JOSE BARROS - Last Filed: 04/19/19 12:23>





- General


Chief Complaint: ETOH Abuse


Stated Complaint: WITHDRAW


Time Seen by Provider: 04/19/19 04:23


Primary Care Provider: 


[facility] [Provider Group] - 05/01/19


[facility] [Outside] - Follow up as needed


Notes: 





Patient is a pleasant 39-year-old male who presents with complaint of severe 

anxiety.  He says that he used to drink alcohol on a regular basis but has been 

clean for 6 months.  Tonight he was upset and sad and therefore drank a large 

amount of vodka.  Last drink was approximately 4 hours ago.  He therefore came 

to the ER because he was vomiting and very tearful and upset.  He denies wanting

 to hurt himself.  He says he does take medications for anxiety and depression. 

(ROMEO LEACH)





- Related Data


Allergies/Adverse Reactions: 


                                        





No Known Allergies Allergy (Verified 07/07/18 09:54)


   











Past Medical History





- Social History


Smoking Status: Unknown if Ever Smoked


Chew tobacco use (# tins/day): No


Frequency of alcohol use: None


Drug Abuse: None


Family History: Reviewed & Not Pertinent


Patient has suicidal ideation: No


Patient has homicidal ideation: No


Renal/ Medical History: Denies: Hx Peritoneal Dialysis


Psychiatric Medical History: Reports: Hx Depression, Hx Post Traumatic Stress 

Disorder





<ROMEO LEACH - Last Filed: 04/19/19 05:24>





Review of Systems





<ROMEO LEACH - Last Filed: 04/19/19 05:24>





- Review of Systems


Notes: 





My Normal Review Basic





REVIEW OF SYSTEMS:


CONSTITUTIONAL :  Denies fever,  chills, or sweats.  Denies recent illness.


EENT:   Denies eye, ear, throat, or mouth pain or symptoms.  Denies nasal or 

sinus congestion.


CARDIOVASCULAR:  Denies chest pain.


RESPIRATORY:  Denies cough, cold, or chest congestion.  Denies shortness of 

breath, difficulty breathing, or wheezing.


GASTROINTESTINAL:  Denies abdominal pain.  Recurrent vomiting.


MUSCULOSKELETAL:  Denies neck or back pain or joint pain or swelling.


SKIN:   Denies rash or skin lesions.


NEUROLOGICAL:  Denies altered mental status or loss of consciousness.  Denies 

headache.  Denies weakness or paralysis or loss of use of either side.  Denies 

problems with gait or speech.  Denies sensory or motor loss.


PSYCHIATRIC: Anxiety.  Depression.  Denies suicidal thoughts.


ALL OTHER SYSTEMS REVIEWED AND NEGATIVE. (ROMEO LEACH)





Physical Exam





<ROMEO LEACH - Last Filed: 04/19/19 05:24>





- Vital signs


Vitals: 


                                        











Temp Pulse Resp BP Pulse Ox


 


 98.3 F   131 H  22 H  141/93 H  96 


 


 04/19/19 03:34  04/19/19 03:34  04/19/19 03:34  04/19/19 03:34  04/19/19 03:34














- Notes


Notes: 





General Appearance: Patient actively trying to vomit.  He is very tearful and 

anxious.


Vitals: reviewed, See vital signs table.


Head: no swelling or tenderness to the head


Eyes: PERRL, EOMI, Conjuctiva clear


Mouth: No decreasd moisture


Lungs: No wheezing, No rales, No rhonci, No accessory muscle use, good air 

exchange bilaterally.


Heart: Cardiac rate, Regular rythm, No murmur, no rub


Abdomen: Normal BS, soft, No rigidity, No abdominal tenderness, No guarding, no 

rebound, no abdominal masses, no organomegaly


Extremities: strength 5/5 in all extremities, good pulses in all extremities, no

 swelling or tenderness in the extremities, no edema.


Skin: warm, dry, appropriate color, no rash


Neuro: speech clear, oriented x 3, normal affect, responds appropriately to 

questions.  Patient moves all extremities without difficulty.  Distal sensation 

intact.  Symmetric facial movement.


Psychiatric: Patient is very tearful and sad.  He is also very anxious on exam.


 (ROMEO LEACH)





Course





- Laboratory


Result Diagrams: 


                                 04/19/19 04:27





                                 04/19/19 04:27





<ROMEO LEACH - Last Filed: 04/19/19 05:24>





- Laboratory


Result Diagrams: 


                                 04/19/19 04:27





                                 04/19/19 04:27





<CAITLIN PATEL - Last Filed: 04/19/19 12:06>





- Laboratory


Result Diagrams: 


                                 04/19/19 04:27





                                 04/19/19 04:27





<JOSE BARROS - Last Filed: 04/19/19 12:23>





- Re-evaluation


Re-evalutation: 





04/19/19 05:09


On reevaluation patient is now calm.  He is no longer vomiting.  He was sleeping

 when into the room.  When I awoke him to ask him how he is doing he said he is 

feeling some better but then started crying again.


04/19/19 05:24








Patient is starting to have some shaking and then is tachycardic again.  Patient

 feels as if he is going through alcohol withdrawal.  Be very odd for him to 

actually be having alcohol withdrawal being that this is the first time he is 

drinking over 6 months.  I talked to him about this.  I feel that is probably 

likely more of an anxiety component but the patient still feels he is having 

withdrawal.  Either way, treatment is the same whith is benzodiazepine 

medications.  I will give him 5 mg of Valium IV to see if this helps calm him 

down again. (ROMEO ELACH)





04/19/19 10:40


Patient has been seen and evaluated resting comfortably no acute distress.  

Laboratory values previous provider note and vital signs have been evaluated.  

Patient otherwise looks to be stable for disposition/transfer.


04/19/19 12:22


Patient was evaluated by psychiatric team at this time who recommends discharge 

home.  Patient with no homicidal suicidal ideation upon my reevaluation.  

Patient sleeping easily arousable upon arousing the patient he does start to 

have facial twitching and tremors that go away when the patient is redirected.  

In spite of the patient that we will give him a prescription for Librium 

tapering patient agrees to follow-up with [facility] provider and agrees with 

disposition home. (JOSE BARROS)





- Vital Signs


Vital signs: 


                                        











Temp Pulse Resp BP Pulse Ox


 


 97.8 F   103 H  19   97/50 L  94 


 


 04/19/19 09:01  04/19/19 11:47  04/19/19 11:47  04/19/19 09:24  04/19/19 11:47














- Laboratory


Laboratory results interpreted by me: 


                                        











  04/19/19 04/19/19 04/19/19





  04:27 04:27 08:15


 


WBC  10.9 H  


 


RBC  5.61 H  


 


RDW  14.1 H  


 


Anion Gap   20 H 


 


BUN   26 H 


 


Glucose   146 H 


 


AST   62 H 


 


Total Protein   9.0 H 


 


Albumin   5.1 H 


 


Urine Protein    100 H


 


Urine Ketones    20 H


 


Salicylates   < 1.0 L 


 


Acetaminophen   < 10 L 














Discharge





<ROMEO LEACH - Last Filed: 04/19/19 05:24>





<AMANDACAITLIN - Last Filed: 04/19/19 12:06>





<JOSE BARROS - Last Filed: 04/19/19 12:23>





- Discharge


Clinical Impression: 


 Alcohol abuse





Condition: Stable


Disposition: HOME, SELF-CARE


Additional Instructions: 


You have been evaluated with medical and behavioral health teams and been deemed

 appropriate for discharge.  You are highly encouraged to follow-up with your 

[facility].  Please discuss with 

them substance abuse treatment options.





ACUTE ALCOHOL INTOXICATION and ALCOHOL ABUSE:





     Your evaluation revealed very high levels of alcohol.  You can die from 

drinking a large amount of alcohol rapidly!  Further, there's the risk of falls,

 traffic accidents, and fights.  A high portion (about 50 percent) of the s

erious injuries seen in hospital emergency rooms are caused by alcohol.


     Alcohol overdosage is usually due to an underlying emotional or psychiatric

 problem.  You may benefit from counselling.


     If "binge" drinking is an ongoing problem for you, or if you drink ANY 

AMOUNT of alcohol EVERY day, you most likely have a tendency to alcoholism.  You

 should avoid alcohol totally.  We can refer you for treatment.  Persons with 

alcohol problems are often also prone to other addictions -- you should discuss 

any use of medications or drugs with the doctor.


     You should be watched at home for the next several hours by someone who has

 not been drinking. Get extra fluids for the next 24 hours.  Call the doctor if 

there is repeated vomiting, increasing headache, decreasing level of alertness, 

or any other worsening.








CHRONIC ALCOHOLISM and ALCOHOL ABUSE:





     Your evaluation reveals evidence of chronic alcoholism, an addiction to 

alcohol.  The tendency to alcoholism may be inherited.


     Chronic use of alcohol weakens muscles, causes fatty deposits in the liver,

 damages the stomach, makes you more prone to infections, and can cause birth 

defects in unborn children.  In the long run, brain atrophy and cirrhosis of the

 liver result.  You are also at greater risk for certain types of cancer, such 

as cancer of the mouth, throat, stomach, and liver.


     Counselling services are available to help you.  In-hospital treatment 

programs often help.  Support groups such as Alcoholics Anonymous can be very 

useful in beating this addiction.  Your physician can make a referral for you.


     As alcoholics often are prone to other addictions, you should discuss your 

use of any other medications with the doctor.








ALCOHOL WITHDRAWAL:





     Your symptoms are caused by alcohol withdrawal. After a period of frequent 

drinking, the brain and body are changed by the alcohol. When you quit or reduce

 your drinking, the nervous system becomes unstable. Withdrawal symptoms can 

start a few hours after your last drink, but sometimes don't begin until a 

couple of days later. Symptoms can include shakiness, sweating, insomnia, 

nausea, vomiting, fearfulness, hallucinations, and seizures.


     In addition to the acute effects of alcohol withdrawal, we often have to 

deal with the medical effects of alcoholism. These problems often include 

dehydration, stomach irritation, intestinal bleeding, low blood sugar, liver 

disease, and pancreas inflammation.


     Treatment for alcohol withdrawal includes mild sedatives, vitamins, and 

fluids. You need to be with someone who can help if symptoms become severe. Many

 patients can withdraw at home. Admission to the hospital or a detox facility 

may be necessary if withdrawal symptoms are severe and uncontrollable.


     Abstaining from alcohol is the only effective long-term treatment. If you 

start drinking again, you will not be able to control yourself after the first 

drink. Treatment programs are available. In addition, many alcoholics benefit 

from Alcoholics Anonymous or other support groups available through your 

counselor or Buddhist . [facility]ON and [employer] are support groups for 

friends and family members of an alcoholic.


     Go to the emergency room if you develop persistent vomiting, severe 

abdominal pain, fever, shortness of breath, hallucinations, uncontrollable 

tremors, or seizures.








FOLLOW-UP CARE:


If you have been referred to a physician for follow-up care, call the 

physicians office for an appointment as you were instructed or within the next 

two days.  If you experience worsening or a significant change in your symptoms,

 notify the physician immediately or return to the Emergency Department at any 

time for re-evaluation.


Prescriptions: 


Chlordiazepoxide HCl [Librium 25 mg Capsule] 1 cap PO TID #6 capsule


Referrals: 


[facility] [Provider Group] - 05/01/19


[facility] [Outside] - Follow up as needed

## 2020-06-26 ENCOUNTER — HOSPITAL ENCOUNTER (EMERGENCY)
Dept: HOSPITAL 62 - ER | Age: 40
Discharge: HOME | End: 2020-06-26
Payer: OTHER GOVERNMENT

## 2020-06-26 VITALS — DIASTOLIC BLOOD PRESSURE: 72 MMHG | SYSTOLIC BLOOD PRESSURE: 133 MMHG

## 2020-06-26 DIAGNOSIS — R53.1: ICD-10-CM

## 2020-06-26 DIAGNOSIS — R00.0: Primary | ICD-10-CM

## 2020-06-26 DIAGNOSIS — F10.230: ICD-10-CM

## 2020-06-26 DIAGNOSIS — F17.210: ICD-10-CM

## 2020-06-26 LAB
ADD MANUAL DIFF: NO
ALBUMIN SERPL-MCNC: 5 G/DL (ref 3.5–5)
ALP SERPL-CCNC: 80 U/L (ref 38–126)
ANION GAP SERPL CALC-SCNC: 20 MMOL/L (ref 5–19)
APAP SERPL-MCNC: < 10 UG/ML (ref 10–30)
APPEARANCE UR: CLEAR
APTT PPP: YELLOW S
AST SERPL-CCNC: 70 U/L (ref 17–59)
BARBITURATES UR QL SCN: NEGATIVE
BASOPHILS # BLD AUTO: 0 10^3/UL (ref 0–0.2)
BASOPHILS NFR BLD AUTO: 0.4 % (ref 0–2)
BILIRUB DIRECT SERPL-MCNC: 0 MG/DL (ref 0–0.4)
BILIRUB SERPL-MCNC: 0.7 MG/DL (ref 0.2–1.3)
BILIRUB UR QL STRIP: NEGATIVE
BUN SERPL-MCNC: 12 MG/DL (ref 7–20)
CALCIUM: 9 MG/DL (ref 8.4–10.2)
CHLORIDE SERPL-SCNC: 95 MMOL/L (ref 98–107)
CO2 SERPL-SCNC: 21 MMOL/L (ref 22–30)
EOSINOPHIL # BLD AUTO: 0 10^3/UL (ref 0–0.6)
EOSINOPHIL NFR BLD AUTO: 0 % (ref 0–6)
ERYTHROCYTE [DISTWIDTH] IN BLOOD BY AUTOMATED COUNT: 16.7 % (ref 11.5–14)
ETHANOL SERPL-MCNC: < 10 MG/DL
GLUCOSE SERPL-MCNC: 202 MG/DL (ref 75–110)
GLUCOSE UR STRIP-MCNC: NEGATIVE MG/DL
HCT VFR BLD CALC: 31.2 % (ref 37.9–51)
HGB BLD-MCNC: 10.3 G/DL (ref 13.5–17)
KETONES UR STRIP-MCNC: 20 MG/DL
LYMPHOCYTES # BLD AUTO: 0.6 10^3/UL (ref 0.5–4.7)
LYMPHOCYTES NFR BLD AUTO: 5.4 % (ref 13–45)
MCH RBC QN AUTO: 27.4 PG (ref 27–33.4)
MCHC RBC AUTO-ENTMCNC: 33 G/DL (ref 32–36)
MCV RBC AUTO: 83 FL (ref 80–97)
METHADONE UR QL SCN: NEGATIVE
MONOCYTES # BLD AUTO: 0.6 10^3/UL (ref 0.1–1.4)
MONOCYTES NFR BLD AUTO: 5.2 % (ref 3–13)
NEUTROPHILS # BLD AUTO: 9.6 10^3/UL (ref 1.7–8.2)
NEUTS SEG NFR BLD AUTO: 89 % (ref 42–78)
NITRITE UR QL STRIP: NEGATIVE
PCP UR QL SCN: NEGATIVE
PH UR STRIP: 5 [PH] (ref 5–9)
PLATELET # BLD: 399 10^3/UL (ref 150–450)
POTASSIUM SERPL-SCNC: 3.6 MMOL/L (ref 3.6–5)
PROT SERPL-MCNC: 7.8 G/DL (ref 6.3–8.2)
PROT UR STRIP-MCNC: 100 MG/DL
RBC # BLD AUTO: 3.76 10^6/UL (ref 4.35–5.55)
SALICYLATES SERPL-MCNC: < 1 MG/DL (ref 2–20)
SP GR UR STRIP: 1.03
TOTAL CELLS COUNTED % (AUTO): 100 %
URINE AMPHETAMINES SCREEN: NEGATIVE
URINE BENZODIAZEPINES SCREEN: NEGATIVE
URINE COCAINE SCREEN: NEGATIVE
URINE MARIJUANA (THC) SCREEN: NEGATIVE
UROBILINOGEN UR-MCNC: NEGATIVE MG/DL (ref ?–2)
WBC # BLD AUTO: 10.8 10^3/UL (ref 4–10.5)

## 2020-06-26 PROCEDURE — 99291 CRITICAL CARE FIRST HOUR: CPT

## 2020-06-26 PROCEDURE — 81001 URINALYSIS AUTO W/SCOPE: CPT

## 2020-06-26 PROCEDURE — 99292 CRITICAL CARE ADDL 30 MIN: CPT

## 2020-06-26 PROCEDURE — 93010 ELECTROCARDIOGRAM REPORT: CPT

## 2020-06-26 PROCEDURE — 96375 TX/PRO/DX INJ NEW DRUG ADDON: CPT

## 2020-06-26 PROCEDURE — 96368 THER/DIAG CONCURRENT INF: CPT

## 2020-06-26 PROCEDURE — 96366 THER/PROPH/DIAG IV INF ADDON: CPT

## 2020-06-26 PROCEDURE — 36415 COLL VENOUS BLD VENIPUNCTURE: CPT

## 2020-06-26 PROCEDURE — 80307 DRUG TEST PRSMV CHEM ANLYZR: CPT

## 2020-06-26 PROCEDURE — 85025 COMPLETE CBC W/AUTO DIFF WBC: CPT

## 2020-06-26 PROCEDURE — 93005 ELECTROCARDIOGRAM TRACING: CPT

## 2020-06-26 PROCEDURE — 96365 THER/PROPH/DIAG IV INF INIT: CPT

## 2020-06-26 PROCEDURE — 80053 COMPREHEN METABOLIC PANEL: CPT

## 2020-06-26 NOTE — ER DOCUMENT REPORT
ED General





- General


Chief Complaint: Alcohol Withdrawl


Stated Complaint: WITHDRAWAL SYMPTOMS/ALCOHOL


Time Seen by Provider: 06/26/20 12:54


Primary [facility]: 


[facility] - Follow up as needed


Mode of Arrival: Ambulatory


Information source: Patient


Notes: 





ED Medical Screen (RME)





- General


Chief Complaint: Alcohol Withdrawl


Stated Complaint: WITHDRAWAL SYMPTOMS/ALCOHOL


Time Seen by Provider: 06/26/20 12:54


Notes: 





Patient is a 40-year-old male who presents emergency department with alcohol 

withdrawals.  Patient states that he drinks [employer] reserve.  Last drink was 

this morning.





Exam: Tachycardic.


my notes


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 

complaint of tachycardia and nerve problems after he attempted to stop drinking 

around 1 week ago.  Patient usually daily drinks 24 cans of "[employer] karrie".

he has had at least 4 5 times of DTs and therefore he knew his symptoms and 

began drinking again this morning.  Patient's heart rate is about 140 bpm.  He 

denies any visual problems or any hallucinations at this time.  He denies any 

cough or cold symptoms.  He does admit to some nausea.


TRAVEL OUTSIDE OF THE [place] IN LAST 30 DAYS: No





- HPI


Onset: Other - this week


Onset/Duration: Sudden


Quality of pain: No pain


Severity: Mild


Pain Level: 2


Associated symptoms: None, Nausea


Exacerbated by: Movement


Relieved by: Denies


Similar symptoms previously: Yes


Recently seen / treated by doctor: No





- Related Data


Allergies/Adverse Reactions: 


                                        





No Known Allergies Allergy (Verified 06/26/20 13:39)


   











Past Medical History





- General


Information source: Patient





- Social History


Smoking Status: Current Every Day Smoker


Cigarette use (# per day): Yes


Chew tobacco use (# tins/day): No


Smoking Education Provided: Yes


Frequency of alcohol use: Heavy - Usually drinks a case of karrie per day


Drug Abuse: None


Lives with: Family


Family History: Reviewed & Not Pertinent


Patient has suicidal ideation: No


Patient has homicidal ideation: No


Renal/ Medical History: Denies: Hx Peritoneal Dialysis


Psychiatric Medical History: Reports: Hx Depression, Hx Post Traumatic Stress 

Disorder





Review of Systems





- Review of Systems


Constitutional: See HPI, Malaise, Weakness


EENT: No symptoms reported


Cardiovascular: No symptoms reported


Respiratory: No symptoms reported


Gastrointestinal: See HPI, Nausea


Genitourinary: No symptoms reported


Male Genitourinary: No symptoms reported


Musculoskeletal: No symptoms reported


Skin: No symptoms reported


Hematologic/Lymphatic: No symptoms reported


Neurological/Psychological: See HPI, Weakness, Tremor





Physical Exam





- Vital signs


Vitals: 


                                        











Pulse Ox


 


 100 


 


 06/26/20 12:57











Interpretation: Tachycardic





- General


General appearance: Anxious





- HEENT


Head: Normocephalic, Atraumatic


Eyes: Normal


Pupils: PERRL


Sinus: Normal


Nasal: Normal


Mouth/Lips: Normal


Mucous membranes: Normal


Pharynx: Normal


Neck: Normal





- Respiratory


Respiratory status: No respiratory distress


Chest status: Nontender


Breath sounds: Normal


Chest palpation: Normal





- Cardiovascular


Rhythm: Tachycardia


Heart sounds: Normal auscultation


Murmur: No





- Abdominal


Inspection: Normal


Distension: No distension


Bowel sounds: Normal


Tenderness: Nontender


Organomegaly: No organomegaly





- Rectal


Hemorrhoids: Other - deferred





- Genitourinary


Scrotum: Other - deferred





- Back


Back: Normal





- Extremities


General upper extremity: Other - tremors non parkinson





- Neurological


Neuro grossly intact: Yes


Cognition: Normal


Orientation: AAOx4


Halma Coma Scale Eye Opening: Spontaneous


Sachi Coma Scale Verbal: Oriented


Sachi Coma Scale Motor: Obeys Commands


Sachi Coma Scale Total: 15


Speech: Normal


Motor strength normal: LUE, RUE, LLE, RLE


Sensory: Normal





- Psychological


Associated symptoms: Anxious





- Skin


Skin Temperature: Warm


Skin Moisture: Diaphoretic





Course





- Vital Signs


Vital signs: 


                                        











Temp Pulse Resp BP Pulse Ox


 


 98.4 F            100 


 


 06/26/20 13:32           06/26/20 12:57














- Laboratory


Result Diagrams: 


                                 06/26/20 12:50





                                 06/26/20 12:50


Laboratory results interpreted by me: 


                                        











  06/26/20 06/26/20 06/26/20





  12:50 12:50 13:41


 


WBC  10.8 H  


 


RBC  3.76 L  


 


Hgb  10.3 L  


 


Hct  31.2 L  


 


RDW  16.7 H  


 


Lymph % (Auto)  5.4 L  


 


Absolute Neuts (auto)  9.6 H  


 


Seg Neutrophils %  89.0 H  


 


Sodium   136.0 L 


 


Chloride   95 L 


 


Carbon Dioxide   21 L 


 


Anion Gap   20 H 


 


Glucose   202 H 


 


AST   70 H 


 


ALT   56 H 


 


Urine Protein    100 H


 


Urine Ketones    20 H


 


Salicylates   < 1.0 L 


 


Acetaminophen   < 10 L 














Critical Care Note





- Critical Care Note


Total time excluding time spent on procedures (mins): 90


Comments: 





Patient reported he would like to be [facility] however it would 

appear [facility] the [facility] knows [facility] and he has a scheduled 

appointment on 8 July at [facility] evaluated this 

patient.  She informed him that patient may go home if physically cleared.





Discharge





- Discharge


Clinical Impression: 


 Tachycardia





Alcohol withdrawal


Qualifiers:


 Complication of substance-induced condition: uncomplicated Qualified Code(s): 

F10.230 - Alcohol dependence with withdrawal, uncomplicated





Condition: Good


Disposition: HOME, SELF-CARE


Additional Instructions: 


Stop drinking alcohol ..on a incremental method.  That is drink smaller and 

smaller amounts.  Take Ativan 1 mg at bedtime.  Follow-up with personal doctor 

next week and also follow-up with [facility] for RICH[facility] Davies

evaluated today by behavioral health Edwin.


Prescriptions: 


Lorazepam [Ativan 1 mg Tablet] 1 mg PO HSP PRN #5 tablet


 PRN Reason: 


Referrals: 


[facility] - Follow up as needed

## 2020-06-26 NOTE — EKG REPORT
SEVERITY:- BORDERLINE ECG -

SINUS TACHYCARDIA

BORDERLINE T ABNORMALITIES, INFERIOR LEADS

:

Confirmed by: Jeffrey Polk 26-Jun-2020 19:23:13

## 2020-06-26 NOTE — ER DOCUMENT REPORT
ED Medical Screen (RME)





- General


Chief Complaint: Alcohol Withdrawl


Stated Complaint: WITHDRAWAL SYMPTOMS/ALCOHOL


Time Seen by Provider: 06/26/20 12:54


Notes: 





Patient is a 40-year-old male who presents emergency department with alcohol 

withdrawals.  Patient states that he drinks [place].  Last drink was 

this morning.





Exam: Tachycardic.





I have greeted and performed a rapid initial assessment of this patient.  A 

comprehensive ED assessment and evaluation of the patient, analysis of test 

results and completion of medical decision making process will be conducted by 

an additional ED providers.


TRAVEL OUTSIDE OF THE [place] IN LAST 30 DAYS: No





- Related Data


Allergies/Adverse Reactions: 


                                        





No Known Allergies Allergy (Verified 07/07/18 09:54)


   











Past Medical History





- Social History


Family history: Thyroid Disfunction


Renal/ Medical History: Denies: Hx Peritoneal Dialysis


Psychiatric Medical History: Reports: Hx Depression, Hx Post Traumatic Stress 

Disorder

## 2020-07-09 ENCOUNTER — HOSPITAL ENCOUNTER (EMERGENCY)
Dept: HOSPITAL 62 - ER | Age: 40
LOS: 2 days | Discharge: PSYCHIATRIC OR SUBSTANCE USE FACILITY | End: 2020-07-11
Payer: OTHER GOVERNMENT

## 2020-07-09 DIAGNOSIS — Z04.6: Primary | ICD-10-CM

## 2020-07-09 DIAGNOSIS — F10.229: ICD-10-CM

## 2020-07-09 DIAGNOSIS — F10.230: ICD-10-CM

## 2020-07-09 DIAGNOSIS — Z03.818: ICD-10-CM

## 2020-07-09 DIAGNOSIS — R25.1: ICD-10-CM

## 2020-07-09 LAB
ADD MANUAL DIFF: NO
ALBUMIN SERPL-MCNC: 4.9 G/DL (ref 3.5–5)
ALP SERPL-CCNC: 90 U/L (ref 38–126)
ANION GAP SERPL CALC-SCNC: 15 MMOL/L (ref 5–19)
APAP SERPL-MCNC: < 10 UG/ML (ref 10–30)
APPEARANCE UR: CLEAR
APTT PPP: YELLOW S
AST SERPL-CCNC: 49 U/L (ref 17–59)
BARBITURATES UR QL SCN: NEGATIVE
BASOPHILS # BLD AUTO: 0.1 10^3/UL (ref 0–0.2)
BASOPHILS NFR BLD AUTO: 1.8 % (ref 0–2)
BILIRUB DIRECT SERPL-MCNC: 0 MG/DL (ref 0–0.4)
BILIRUB SERPL-MCNC: 0.4 MG/DL (ref 0.2–1.3)
BILIRUB UR QL STRIP: NEGATIVE
BUN SERPL-MCNC: 13 MG/DL (ref 7–20)
CALCIUM: 8.8 MG/DL (ref 8.4–10.2)
CHLORIDE SERPL-SCNC: 103 MMOL/L (ref 98–107)
CO2 SERPL-SCNC: 22 MMOL/L (ref 22–30)
EOSINOPHIL # BLD AUTO: 0 10^3/UL (ref 0–0.6)
EOSINOPHIL NFR BLD AUTO: 0.7 % (ref 0–6)
ERYTHROCYTE [DISTWIDTH] IN BLOOD BY AUTOMATED COUNT: 18 % (ref 11.5–14)
ETHANOL SERPL-MCNC: 324 MG/DL
GLUCOSE SERPL-MCNC: 121 MG/DL (ref 75–110)
GLUCOSE UR STRIP-MCNC: NEGATIVE MG/DL
HCT VFR BLD CALC: 36.6 % (ref 37.9–51)
HGB BLD-MCNC: 12 G/DL (ref 13.5–17)
KETONES UR STRIP-MCNC: NEGATIVE MG/DL
LYMPHOCYTES # BLD AUTO: 1.9 10^3/UL (ref 0.5–4.7)
LYMPHOCYTES NFR BLD AUTO: 33.9 % (ref 13–45)
MCH RBC QN AUTO: 26.3 PG (ref 27–33.4)
MCHC RBC AUTO-ENTMCNC: 32.8 G/DL (ref 32–36)
MCV RBC AUTO: 80 FL (ref 80–97)
METHADONE UR QL SCN: NEGATIVE
MONOCYTES # BLD AUTO: 0.2 10^3/UL (ref 0.1–1.4)
MONOCYTES NFR BLD AUTO: 4.4 % (ref 3–13)
NEUTROPHILS # BLD AUTO: 3.3 10^3/UL (ref 1.7–8.2)
NEUTS SEG NFR BLD AUTO: 59.2 % (ref 42–78)
NITRITE UR QL STRIP: NEGATIVE
PCP UR QL SCN: NEGATIVE
PH UR STRIP: 7 [PH] (ref 5–9)
PLATELET # BLD: 517 10^3/UL (ref 150–450)
POTASSIUM SERPL-SCNC: 4 MMOL/L (ref 3.6–5)
PROT SERPL-MCNC: 8.3 G/DL (ref 6.3–8.2)
PROT UR STRIP-MCNC: 100 MG/DL
RBC # BLD AUTO: 4.56 10^6/UL (ref 4.35–5.55)
SALICYLATES SERPL-MCNC: < 1 MG/DL (ref 2–20)
SP GR UR STRIP: 1.02
TOTAL CELLS COUNTED % (AUTO): 100 %
URINE AMPHETAMINES SCREEN: NEGATIVE
URINE BENZODIAZEPINES SCREEN: (no result)
URINE COCAINE SCREEN: NEGATIVE
URINE MARIJUANA (THC) SCREEN: NEGATIVE
UROBILINOGEN UR-MCNC: NEGATIVE MG/DL (ref ?–2)
WBC # BLD AUTO: 5.6 10^3/UL (ref 4–10.5)

## 2020-07-09 PROCEDURE — 80053 COMPREHEN METABOLIC PANEL: CPT

## 2020-07-09 PROCEDURE — 96374 THER/PROPH/DIAG INJ IV PUSH: CPT

## 2020-07-09 PROCEDURE — 96375 TX/PRO/DX INJ NEW DRUG ADDON: CPT

## 2020-07-09 PROCEDURE — 87635 SARS-COV-2 COVID-19 AMP PRB: CPT

## 2020-07-09 PROCEDURE — 99285 EMERGENCY DEPT VISIT HI MDM: CPT

## 2020-07-09 PROCEDURE — 80307 DRUG TEST PRSMV CHEM ANLYZR: CPT

## 2020-07-09 PROCEDURE — 96361 HYDRATE IV INFUSION ADD-ON: CPT

## 2020-07-09 PROCEDURE — 93010 ELECTROCARDIOGRAM REPORT: CPT

## 2020-07-09 PROCEDURE — C9803 HOPD COVID-19 SPEC COLLECT: HCPCS

## 2020-07-09 PROCEDURE — 93005 ELECTROCARDIOGRAM TRACING: CPT

## 2020-07-09 PROCEDURE — 36415 COLL VENOUS BLD VENIPUNCTURE: CPT

## 2020-07-09 PROCEDURE — 85025 COMPLETE CBC W/AUTO DIFF WBC: CPT

## 2020-07-09 PROCEDURE — 81001 URINALYSIS AUTO W/SCOPE: CPT

## 2020-07-09 RX ADMIN — LORAZEPAM PRN MG: 1 TABLET ORAL at 19:51

## 2020-07-09 RX ADMIN — LORAZEPAM PRN MG: 1 TABLET ORAL at 21:43

## 2020-07-09 RX ADMIN — LORAZEPAM PRN MG: 1 TABLET ORAL at 15:40

## 2020-07-09 NOTE — ER DOCUMENT REPORT
ED General





- General


Chief Complaint: Suicidal Ideation


Stated Complaint: ALCOHOL WITHDRAWL


Time Seen by Provider: 07/09/20 12:39


Primary Care Provider: 


[facility] [[facility] - Follow up as needed


Mode of Arrival: Wheelchair


Notes: 





Patient presents with "I want a detox bed" last drink 8 AM feeling tremulous and

anxious moderate to severe.  No seizure activity no nausea no vomiting.  Denies 

belly pain or chest pain.  Not suicidal.


TRAVEL OUTSIDE OF THE [place] IN LAST 30 DAYS: No





- Related Data


Allergies/Adverse Reactions: 


                                        





No Known Allergies Allergy (Verified 06/26/20 13:39)


   











Past Medical History





- General


Information source: Patient





- Social History


Smoking Status: Never Smoker


Frequency of alcohol use: Heavy


Drug Abuse: None


Family History: Reviewed & Not Pertinent


Patient has homicidal ideation: No


Renal/ Medical History: Denies: Hx Peritoneal Dialysis


Psychiatric Medical History: Reports: Hx Depression, Hx Post Traumatic Stress 

Disorder





Review of Systems





- Review of Systems


Notes: 





REVIEW OF SYSTEMS





GEN: Denies fever, chills, weight loss


ENT: Denies sore throat, nasal discharge, ear pain


EYES: Denies blurry vision, eye pain, discharge


CV: Denies chest pain, palpitations, edema


RESP: Denies cough, shortness of breath, wheezing


GI: Denies abdominal pain, nausea, vomiting, diarrhea


MSK: Denies joint pain/swelling, edema, 


SKIN: Denies rash, skin lesions


LYMPH: Denies swollen glands/lymph nodes


NEURO: D Milagros anxious


PSYCH: No abuse








PHYSICAL EXAMINATION





General: No acute distress, well-nourished


Head: Atraumatic, normocephalic


ENT: Mouth normal, oropharynx moist, no exudates or tonsillar enlargement


Eyes: Conjunctiva normal, pupils equal, lids normal


Neck: No JVD, supple, no guarding


CVS: Normal rate, regular rhythm, no murmurs


Resp: No resp distress, equal and normal breath sounds bilaterally


GI: tachyCardiac soft, no tenderness to palpation, no rebound or guarding


Ext: No deformities, no edema, normal range of motion in upper and lower ext


Back: No CVA or midline TTP


Skin: No rash, warm


Lymphatic: No lymphadeopathy noted


Neuro: Awake, alert.  Coarse tremor hands and tongue





Physical Exam





- Vital signs


Vitals: 


                                        











Temp Pulse Resp BP Pulse Ox


 


 99.0 F   120 H  17   139/73 H  96 


 


 07/09/20 12:34  07/09/20 12:34  07/09/20 12:34  07/09/20 12:34  07/09/20 12:34














Course





- Re-evaluation


Re-evalutation: 





07/09/20 13:42


Moderate alcohol withdrawal no suicidal ideation likely dehydrated


Fluids phenobarbital loading will help to discharge with outpatient detox


07/09/20 15:51


Patient's labs show that alcohol otherwise essentially normal.  I spoke with 

behavioral health and they [facility].  I told him he will need medical detox.  He was given a phenobarbital 

load, and reassessed him afterward.  Heart rate was down around 100, he was 

arousable to loud voice but still felt anxious.  I have written him for a 3-hour

every 1 hour Ativan 2 mg p.o. regimen, have given him additional p.o. fluids and

he is medically cleared.  He is already on IVC paperwork from behavioral health.





- Vital Signs


Vital signs: 


                                        











Temp Pulse Resp BP Pulse Ox


 


 98.0 F   114 H  20   104/63   99 


 


 07/09/20 15:48  07/09/20 15:48  07/09/20 15:48  07/09/20 15:48  07/09/20 15:48














- Laboratory


Result Diagrams: 


                                 07/09/20 12:59





                                 07/09/20 12:59


Laboratory results interpreted by me: 


                                        











  07/09/20 07/09/20 07/09/20





  12:59 12:59 13:23


 


Hgb  12.0 L  


 


Hct  36.6 L  


 


MCH  26.3 L  


 


RDW  18.0 H  


 


Plt Count  517 H  


 


Glucose   121 H 


 


Total Protein   8.3 H 


 


Urine Protein    100 H


 


Salicylates   < 1.0 L 


 


Acetaminophen   < 10 L 


 


Serum Alcohol   324 H* 














Critical Care Note





- Critical Care Note


Total time excluding time spent on procedures (mins): 31


Comments: 





The above patient is critically ill.  Not including procedures, but including 

direct re-evaluations, speaking with patient and/or consultants, interpreting 

results, and documenting, I spent the total amount of minute listed listed above

on critical care time





Discharge





- Discharge


Clinical Impression: 


 Uncomplicated alcohol withdrawal





Condition: Good


Disposition: PSYCH HOSP/UNIT


Referrals: 


[facility] [[facility] - Follow up as needed

## 2020-07-09 NOTE — EKG REPORT
SEVERITY:- BORDERLINE ECG -

SINUS TACHYCARDIA

BORDERLINE T WAVE ABNORMALITIES

:

Confirmed by: Amilcar Garibay MD 09-Jul-2020 18:10:51

## 2020-07-09 NOTE — PSYCHOLOGICAL NOTE
Psych Note





- Psych Note


Date seen by psych provider: 07/09/20


Time seen by psych provider: 13:55


Psych Note: 


Reason for Consult: Suicidal ideation





Patient presented to [facility] ED via POV asking for Detox.  He reports to intake 

staff he wants to kill himself with a plan to drink himself to death. 





[facility].  They report the patient had a phone 

appointment yesterday with Dr. Jackson.  During that appointment, he reported 

relapse; currently drinking 1/2 gallon daily. He denied suicidal ideation during

visit. Patient was discharged from [facility] on 07/02/2020.  his home 

medications are zoloft 75mg daily, prazosin 2mg at bedtime and Vistaril 50mg at 

bedtime as needed.  





Patient only minimally engages with clinician.  He reports that he came to [facility] 

ED for detox.  He reports he has been having nightmares and visions in 

connection with his PTSD.  Clinician asked patient why he did not discuss his 

emotions surrounding suicidal ideation with Dr. Jackson yesterday during his 

phone appointment.  Patient is observed starting to cry and refused to further 

engage with clinician.  Clinician attempted to reengage patient reminding him 

that [facility] help, but he has to be honest with his 

thoughts and feelings.  Patient continue to cry.  After approximately 2 minutes,

patient asked clinician how long clinician would sit there.  It was clarified 

that clinician would stay with patient as long as patient wanted, patient 

requested clinician to leave room.





Patient is alert and orientated to person, place, time and circumstance.  Mood 

is dysphoric with tearful affect. clinician notes patient is currently into

xicated.  Patient reported suicidal ideation with a plan of drinking himself to 

death upon arrival.  Patient denied homicidal ideation.  Delusions are absent 

behaviors congruent with an intact reality based presentation I organized and 

linear thought process.  Eye contact is poor.  Conversational speech is minimal,

quiet and short.  Intellectual abilities appear to be within the average range. 

Attention and concentration is fair.  Insight, judgment, impulse control is 

poor.





Clinical presentation:


Alcohol intoxication; with use disorder, severe


Suicidal ideation with plan


Chronic PTSD symptoms





Impression/plan: Patient is recommended for IVC.  Patient is currently under the

influence and and reporting suicidal ideation with a plan of drinking himself to

death.  Patient is high risk due to his chronic PTSD symptoms and alcohol use 

disorder.  Patient was just released from detox on 7/2/2020 however immediately 

relapsed upon returning home.  There is significant concern the patient will 

engage in self-harm while intoxicated.  Patient reports not taking his 

psychiatric medications when drinking; unfortunately, he is drinking a half a 

gallon daily. Dr. Amador was consulted on the care and management of this 

patient; attending physician is in agreement with recommendations and 

disposition.

## 2020-07-10 VITALS — DIASTOLIC BLOOD PRESSURE: 80 MMHG | SYSTOLIC BLOOD PRESSURE: 121 MMHG

## 2020-07-10 RX ADMIN — LORAZEPAM PRN MG: 1 TABLET ORAL at 21:46

## 2020-07-10 RX ADMIN — LORAZEPAM PRN MG: 1 TABLET ORAL at 18:56

## 2020-07-10 RX ADMIN — LORAZEPAM PRN MG: 1 TABLET ORAL at 06:43

## 2020-07-10 RX ADMIN — LORAZEPAM PRN MG: 1 TABLET ORAL at 14:23

## 2020-07-10 RX ADMIN — LORAZEPAM PRN MG: 1 TABLET ORAL at 02:23

## 2020-07-10 NOTE — PSYCHOLOGICAL NOTE
Psych Note





- Psych Note


Date seen by psych provider: 07/10/20


Time seen by psych provider: 12:00


Psych Note: 





Reason for Consult: Suicidal ideation





Patient presented to [facility] ED via POV asking for Detox.  He reports to intake 

staff he wants to kill himself with a plan to drink himself to death. 





Check in conducted with patient:


Patient provided plan of care.  [facility] will accept pending negative 

COVID.  Patient states he can drive himself.  Patient is reminded he was placed 

on papers yesterday so [facility] will be transporting.  Patient states he has no 

further questions.





Clinical presentation:


Alcohol withdrawal; with use disorder, severe


Suicidal ideation with plan


Chronic PTSD symptoms





Impression/plan: Patient is recommended for continued IVC.  Patient is high risk

due to his chronic PTSD symptoms and alcohol use disorder.  Patient was just 

released from detox on 7/2/2020 however immediately relapsed upon returning 

home.  There is significant concern the patient will engage in self-harm while 

intoxicated.  Patient reports not taking his psychiatric medications when 

drinking; unfortunately, he is drinking a half a gallon daily. Patient has been 

accepted by [facility]; transportation has been requested.  Dr. Amador was

consulted on the care and management of this patient; attending physician is in 

agreement with recommendations and disposition. 





Case mangement:


updated information faxed to [place] [id number] clinician spoke with Rodrigo Denis.  Patient has been accepted pending 

rapid COVID test


[id number] clinician contacted Rodrigo Denis to inform of negative COVID test.  

Accepting physician is Dr. Brasher


[id number] clinician requested transport

## 2020-07-10 NOTE — ER DOCUMENT REPORT
Doctor's Note


Notes: 





07/10/20 10:27


I reviewed the chart.  I was not given report on the patient.  Patient labs show

that he is positive for benzodiazepines and alcohol at time of intake.  Awaiting

reevaluation by psychiatric team regarding placement or plan


07/10/20 17:08


Pending COVID tests patient will be accepted at [facility] for detox

## 2020-07-11 RX ADMIN — LORAZEPAM PRN MG: 1 TABLET ORAL at 08:37

## 2023-09-07 NOTE — ER DOCUMENT REPORT
ED Medical Screen (RME)





- General


Chief Complaint: Alcohol Withdrawl


Stated Complaint: ALCOHOL WITHDRAWL


Time Seen by Provider: 07/09/20 12:39


Primary Care Provider: 


J[facility] DUM[facility] [Primary Care Provider] - Follow up as needed


Mode of Arrival: Wheelchair


Information source: Patient


Notes: 





HPI; 40-year-old male presents emergency room requesting detox from alcohol as 

well as suicidal ideation.  Patient states he drinks about half a gallon of 

alcohol a day.  States his last drink was around 8 AM this morning.  States he 

recently detoxed a week ago but continues to drink.  Denies any suicidal plan, 

however he does state "I was hoping to drink myself to death"





PE:


Alert and oriented x3.  Lungs: Clear to auscultation without rales rhonchi or 

wheezes.  Heart: Tachycardic without murmurs, rubs, gallops.  Patient is 

cooperative, tearful in triage.





I have greeted and performed a rapid initial assessment of this patient.  A 

comprehensive ED assessment and evaluation of the patient, analysis of test 

results and completion of the medical decision making process will be conducted 

by additional ED providers.  I have specifically instructed the patient or 

family members with the patient to immediately return to any nursing staff 

should anything change in the patient's condition or with their chief complaint.


TRAVEL OUTSIDE OF THE [place] IN LAST 30 DAYS: No





- Related Data


Allergies/Adverse Reactions: 


                                        





No Known Allergies Allergy (Verified 06/26/20 13:39)


   











Past Medical History





- Social History


Family history: Thyroid Disfunction


Renal/ Medical History: Denies: Hx Peritoneal Dialysis


Psychiatric Medical History: Reports: Hx Depression, Hx Post Traumatic Stress 

Disorder





Physical Exam





- Vital signs


Vitals: 





                                        











Temp Pulse Resp BP Pulse Ox


 


 99.0 F   120 H  17   139/73 H  96 


 


 07/09/20 12:34  07/09/20 12:34  07/09/20 12:34  07/09/20 12:34  07/09/20 12:34














Course





- Vital Signs


Vital signs: 





                                        











Temp Pulse Resp BP Pulse Ox


 


 99.0 F   120 H  17   139/73 H  96 


 


 07/09/20 12:34  07/09/20 12:34  07/09/20 12:34  07/09/20 12:34  07/09/20 12:34














Doctor's Discharge





- Discharge


Referrals: 


[facility] [[facility] - Follow up as needed Urine HCG- Negative/CBC/CMP/PT/PTT/INR